# Patient Record
Sex: MALE | Race: WHITE | NOT HISPANIC OR LATINO | URBAN - NONMETROPOLITAN AREA
[De-identification: names, ages, dates, MRNs, and addresses within clinical notes are randomized per-mention and may not be internally consistent; named-entity substitution may affect disease eponyms.]

---

## 2019-07-19 ENCOUNTER — IMPORTED ENCOUNTER (OUTPATIENT)
Dept: URBAN - NONMETROPOLITAN AREA CLINIC 1 | Facility: CLINIC | Age: 54
End: 2019-07-19

## 2019-07-19 PROBLEM — Z46.0: Noted: 2019-07-19

## 2019-07-19 PROBLEM — H52.223: Noted: 2019-07-19

## 2019-07-19 PROBLEM — H52.13: Noted: 2019-07-19

## 2019-07-19 PROBLEM — H25.813: Noted: 2019-07-19

## 2019-07-19 PROCEDURE — 92310 CONTACT LENS FITTING OU: CPT

## 2019-07-19 PROCEDURE — 92015 DETERMINE REFRACTIVE STATE: CPT

## 2019-07-19 PROCEDURE — V2520 CONTACT LENS HYDROPHILIC: HCPCS

## 2019-07-19 PROCEDURE — 92004 COMPRE OPH EXAM NEW PT 1/>: CPT

## 2022-04-09 ASSESSMENT — VISUAL ACUITY
OU_SC: 20/20
OD_SC: 20/20
OS_SC: 20/20

## 2022-04-09 ASSESSMENT — TONOMETRY
OS_IOP_MMHG: 15
OD_IOP_MMHG: 16

## 2024-04-01 NOTE — PATIENT DISCUSSION
Myopia-Discussed diagnosis with patient. -Explained that people who are myopic are at a higher risk for developing RD/RT and reviewed associated S&S.-Pt to contact our office if symptoms develop. Astigmatism-Discussed diagnosis with patient. CL wear-CLs fit and center well.-Stressed that patient should not sleep in CL. -Updated CL Rx given. -CL care and precautions given. Cataract OU-Not yet surgical. -Reviewed symptoms of advancing cataract growth such as glare and halos and decreased vision.-Continue to monitor for now. Pt will notify us if any new symptoms develop. RTC 1 yr CL 2 Yr CEE
unable to assess